# Patient Record
Sex: MALE | Race: ASIAN | URBAN - METROPOLITAN AREA
[De-identification: names, ages, dates, MRNs, and addresses within clinical notes are randomized per-mention and may not be internally consistent; named-entity substitution may affect disease eponyms.]

---

## 2022-06-21 ENCOUNTER — APPOINTMENT (RX ONLY)
Dept: URBAN - METROPOLITAN AREA CLINIC 94 | Facility: CLINIC | Age: 82
Setting detail: DERMATOLOGY
End: 2022-06-21

## 2022-06-21 PROBLEM — D23.9 OTHER BENIGN NEOPLASM OF SKIN, UNSPECIFIED: Status: ACTIVE | Noted: 2022-06-21

## 2022-06-21 PROCEDURE — ? PREVENTATIVE SKIN CANCER SCREENING

## 2022-06-21 NOTE — PROCEDURE: PREVENTATIVE SKIN CANCER SCREENING
Billing Warning: If you want to bill the  [id number] cpt codes you must manually override the bill. Billing Warning: If you want to bill the  [id number] cpt codes you must manually override the bill.

## 2022-06-21 NOTE — PROCEDURE: PREVENTATIVE SKIN CANCER SCREENING
Initial Screening Text: Skin cancer screening performed of all exposed skin.  No lesions of concern were identified today.  \\n\\nWe have recommended an annual full body skin exam and daily use of broad spectrum spf 30+ sunscreen.

## 2022-12-28 ENCOUNTER — APPOINTMENT (RX ONLY)
Dept: URBAN - METROPOLITAN AREA CLINIC 94 | Facility: CLINIC | Age: 82
Setting detail: DERMATOLOGY
End: 2022-12-28

## 2022-12-28 ENCOUNTER — APPOINTMENT (RX ONLY)
Dept: URBAN - METROPOLITAN AREA CLINIC 93 | Facility: CLINIC | Age: 82
Setting detail: DERMATOLOGY
End: 2022-12-28

## 2022-12-28 DIAGNOSIS — L82.1 OTHER SEBORRHEIC KERATOSIS: ICD-10-CM

## 2022-12-28 DIAGNOSIS — M71 OTHER BURSOPATHIES: ICD-10-CM

## 2022-12-28 PROBLEM — D48.5 NEOPLASM OF UNCERTAIN BEHAVIOR OF SKIN: Status: ACTIVE | Noted: 2022-12-28

## 2022-12-28 PROCEDURE — ? ADDITIONAL NOTES

## 2022-12-28 PROCEDURE — ? BIOPSY BY SHAVE METHOD

## 2022-12-28 PROCEDURE — ? COUNSELING

## 2022-12-28 PROCEDURE — ? FULL BODY SKIN EXAM - DECLINED

## 2022-12-28 PROCEDURE — 99212 OFFICE O/P EST SF 10 MIN: CPT | Mod: 25

## 2022-12-28 PROCEDURE — 11102 TANGNTL BX SKIN SINGLE LES: CPT

## 2022-12-28 ASSESSMENT — LOCATION SIMPLE DESCRIPTION DERM
LOCATION SIMPLE: LEFT CHEEK
LOCATION SIMPLE: RIGHT 3RD TOE

## 2022-12-28 ASSESSMENT — LOCATION ZONE DERM
LOCATION ZONE: TOE
LOCATION ZONE: FACE

## 2022-12-28 ASSESSMENT — LOCATION DETAILED DESCRIPTION DERM
LOCATION DETAILED: RIGHT DORSAL 3RD TOE
LOCATION DETAILED: LEFT SUPERIOR PREAURICULAR CHEEK